# Patient Record
Sex: FEMALE | Race: WHITE | NOT HISPANIC OR LATINO | URBAN - METROPOLITAN AREA
[De-identification: names, ages, dates, MRNs, and addresses within clinical notes are randomized per-mention and may not be internally consistent; named-entity substitution may affect disease eponyms.]

---

## 2020-02-17 ENCOUNTER — OFFICE (OUTPATIENT)
Dept: URBAN - METROPOLITAN AREA CLINIC 10 | Facility: CLINIC | Age: 75
End: 2020-02-17
Payer: COMMERCIAL

## 2020-02-17 VITALS
HEART RATE: 89 BPM | WEIGHT: 131 LBS | SYSTOLIC BLOOD PRESSURE: 141 MMHG | DIASTOLIC BLOOD PRESSURE: 77 MMHG | HEIGHT: 62 IN

## 2020-02-17 DIAGNOSIS — A09 INFECTIOUS GASTROENTERITIS AND COLITIS, UNSPECIFIED: ICD-10-CM

## 2020-02-17 PROCEDURE — 99204 OFFICE O/P NEW MOD 45 MIN: CPT | Performed by: INTERNAL MEDICINE

## 2020-02-17 RX ORDER — SODIUM SULFATE, POTASSIUM SULFATE, MAGNESIUM SULFATE 17.5; 3.13; 1.6 G/ML; G/ML; G/ML
SOLUTION, CONCENTRATE ORAL
Qty: 1 | Refills: 0 | Status: ACTIVE
Start: 2020-02-17

## 2020-02-17 RX ORDER — SODIUM SULFATE, POTASSIUM SULFATE, MAGNESIUM SULFATE 17.5; 3.13; 1.6 G/ML; G/ML; G/ML
SOLUTION, CONCENTRATE ORAL
Qty: 1 | Refills: 0 | Status: COMPLETED
Start: 2020-02-17 | End: 2020-05-27

## 2020-05-27 ENCOUNTER — AMBULATORY SURGICAL CENTER (OUTPATIENT)
Dept: URBAN - METROPOLITAN AREA SURGERY 1 | Facility: SURGERY | Age: 75
End: 2020-05-27
Payer: MEDICARE

## 2020-05-27 ENCOUNTER — AMBULATORY SURGICAL CENTER (OUTPATIENT)
Dept: URBAN - METROPOLITAN AREA SURGERY 1 | Facility: SURGERY | Age: 75
End: 2020-05-27
Payer: COMMERCIAL

## 2020-05-27 ENCOUNTER — OFFICE (OUTPATIENT)
Dept: URBAN - METROPOLITAN AREA PATHOLOGY 22 | Facility: PATHOLOGY | Age: 75
End: 2020-05-27
Payer: COMMERCIAL

## 2020-05-27 VITALS
DIASTOLIC BLOOD PRESSURE: 63 MMHG | HEART RATE: 68 BPM | HEIGHT: 62 IN | SYSTOLIC BLOOD PRESSURE: 109 MMHG | RESPIRATION RATE: 16 BRPM | HEART RATE: 66 BPM | SYSTOLIC BLOOD PRESSURE: 99 MMHG | HEART RATE: 66 BPM | OXYGEN SATURATION: 99 % | HEART RATE: 66 BPM | HEART RATE: 62 BPM | HEART RATE: 68 BPM | HEART RATE: 62 BPM | DIASTOLIC BLOOD PRESSURE: 54 MMHG | HEART RATE: 68 BPM | WEIGHT: 131 LBS | TEMPERATURE: 98.4 F | SYSTOLIC BLOOD PRESSURE: 120 MMHG | RESPIRATION RATE: 15 BRPM | DIASTOLIC BLOOD PRESSURE: 57 MMHG | SYSTOLIC BLOOD PRESSURE: 120 MMHG | HEART RATE: 63 BPM | DIASTOLIC BLOOD PRESSURE: 51 MMHG | DIASTOLIC BLOOD PRESSURE: 63 MMHG | DIASTOLIC BLOOD PRESSURE: 54 MMHG | TEMPERATURE: 98.2 F | OXYGEN SATURATION: 98 % | SYSTOLIC BLOOD PRESSURE: 109 MMHG | SYSTOLIC BLOOD PRESSURE: 116 MMHG | SYSTOLIC BLOOD PRESSURE: 109 MMHG | DIASTOLIC BLOOD PRESSURE: 63 MMHG | SYSTOLIC BLOOD PRESSURE: 152 MMHG | WEIGHT: 131 LBS | SYSTOLIC BLOOD PRESSURE: 111 MMHG | OXYGEN SATURATION: 99 % | HEART RATE: 63 BPM | HEART RATE: 60 BPM | OXYGEN SATURATION: 98 % | SYSTOLIC BLOOD PRESSURE: 152 MMHG | HEART RATE: 63 BPM | TEMPERATURE: 98.4 F | DIASTOLIC BLOOD PRESSURE: 76 MMHG | RESPIRATION RATE: 16 BRPM | SYSTOLIC BLOOD PRESSURE: 99 MMHG | DIASTOLIC BLOOD PRESSURE: 54 MMHG | HEIGHT: 62 IN | OXYGEN SATURATION: 97 % | OXYGEN SATURATION: 99 % | SYSTOLIC BLOOD PRESSURE: 120 MMHG | SYSTOLIC BLOOD PRESSURE: 116 MMHG | OXYGEN SATURATION: 100 % | DIASTOLIC BLOOD PRESSURE: 57 MMHG | HEART RATE: 67 BPM | SYSTOLIC BLOOD PRESSURE: 151 MMHG | SYSTOLIC BLOOD PRESSURE: 108 MMHG | DIASTOLIC BLOOD PRESSURE: 47 MMHG | HEART RATE: 63 BPM | HEART RATE: 62 BPM | HEIGHT: 62 IN | DIASTOLIC BLOOD PRESSURE: 76 MMHG | OXYGEN SATURATION: 97 % | RESPIRATION RATE: 16 BRPM | SYSTOLIC BLOOD PRESSURE: 120 MMHG | DIASTOLIC BLOOD PRESSURE: 51 MMHG | SYSTOLIC BLOOD PRESSURE: 111 MMHG | HEART RATE: 68 BPM | SYSTOLIC BLOOD PRESSURE: 116 MMHG | HEART RATE: 65 BPM | WEIGHT: 131 LBS | SYSTOLIC BLOOD PRESSURE: 151 MMHG | DIASTOLIC BLOOD PRESSURE: 50 MMHG | SYSTOLIC BLOOD PRESSURE: 111 MMHG | HEART RATE: 60 BPM | RESPIRATION RATE: 15 BRPM | HEART RATE: 65 BPM | RESPIRATION RATE: 15 BRPM | OXYGEN SATURATION: 100 % | HEART RATE: 65 BPM | SYSTOLIC BLOOD PRESSURE: 108 MMHG | SYSTOLIC BLOOD PRESSURE: 111 MMHG | DIASTOLIC BLOOD PRESSURE: 47 MMHG | SYSTOLIC BLOOD PRESSURE: 152 MMHG | SYSTOLIC BLOOD PRESSURE: 116 MMHG | TEMPERATURE: 98.4 F | TEMPERATURE: 98.4 F | SYSTOLIC BLOOD PRESSURE: 151 MMHG | DIASTOLIC BLOOD PRESSURE: 76 MMHG | TEMPERATURE: 98.2 F | OXYGEN SATURATION: 98 % | OXYGEN SATURATION: 100 % | DIASTOLIC BLOOD PRESSURE: 57 MMHG | HEART RATE: 66 BPM | DIASTOLIC BLOOD PRESSURE: 51 MMHG | SYSTOLIC BLOOD PRESSURE: 99 MMHG | OXYGEN SATURATION: 100 % | SYSTOLIC BLOOD PRESSURE: 152 MMHG | TEMPERATURE: 98.2 F | RESPIRATION RATE: 16 BRPM | HEART RATE: 67 BPM | HEART RATE: 60 BPM | HEIGHT: 62 IN | OXYGEN SATURATION: 97 % | TEMPERATURE: 98.2 F | DIASTOLIC BLOOD PRESSURE: 47 MMHG | OXYGEN SATURATION: 98 % | DIASTOLIC BLOOD PRESSURE: 57 MMHG | SYSTOLIC BLOOD PRESSURE: 99 MMHG | DIASTOLIC BLOOD PRESSURE: 76 MMHG | HEART RATE: 62 BPM | OXYGEN SATURATION: 97 % | DIASTOLIC BLOOD PRESSURE: 54 MMHG | SYSTOLIC BLOOD PRESSURE: 109 MMHG | DIASTOLIC BLOOD PRESSURE: 50 MMHG | DIASTOLIC BLOOD PRESSURE: 63 MMHG | HEART RATE: 67 BPM | DIASTOLIC BLOOD PRESSURE: 50 MMHG | HEART RATE: 67 BPM | DIASTOLIC BLOOD PRESSURE: 51 MMHG | WEIGHT: 131 LBS | SYSTOLIC BLOOD PRESSURE: 108 MMHG | OXYGEN SATURATION: 99 % | RESPIRATION RATE: 15 BRPM | DIASTOLIC BLOOD PRESSURE: 50 MMHG | SYSTOLIC BLOOD PRESSURE: 151 MMHG | HEART RATE: 60 BPM | HEART RATE: 65 BPM | DIASTOLIC BLOOD PRESSURE: 47 MMHG | SYSTOLIC BLOOD PRESSURE: 108 MMHG

## 2020-05-27 DIAGNOSIS — Z12.11 ENCOUNTER FOR SCREENING FOR MALIGNANT NEOPLASM OF COLON: ICD-10-CM

## 2020-05-27 DIAGNOSIS — D12.0 BENIGN NEOPLASM OF CECUM: ICD-10-CM

## 2020-05-27 DIAGNOSIS — K57.30 DIVERTICULOSIS OF LARGE INTESTINE WITHOUT PERFORATION OR ABS: ICD-10-CM

## 2020-05-27 DIAGNOSIS — K64.1 SECOND DEGREE HEMORRHOIDS: ICD-10-CM

## 2020-05-27 DIAGNOSIS — K62.89 OTHER SPECIFIED DISEASES OF ANUS AND RECTUM: ICD-10-CM

## 2020-05-27 DIAGNOSIS — D12.3 BENIGN NEOPLASM OF TRANSVERSE COLON: ICD-10-CM

## 2020-05-27 DIAGNOSIS — D12.2 BENIGN NEOPLASM OF ASCENDING COLON: ICD-10-CM

## 2020-05-27 PROBLEM — K63.5 POLYP OF COLON: Status: ACTIVE | Noted: 2020-05-27

## 2020-05-27 PROCEDURE — 88305 TISSUE EXAM BY PATHOLOGIST: CPT | Performed by: INTERNAL MEDICINE

## 2020-05-27 PROCEDURE — G8918 PT W/O PREOP ORDER IV AB PRO: HCPCS | Performed by: INTERNAL MEDICINE

## 2020-05-27 PROCEDURE — 45380 COLONOSCOPY AND BIOPSY: CPT | Mod: 59,PT | Performed by: INTERNAL MEDICINE

## 2020-05-27 PROCEDURE — 45385 COLONOSCOPY W/LESION REMOVAL: CPT | Mod: PT | Performed by: INTERNAL MEDICINE

## 2020-05-27 PROCEDURE — G8907 PT DOC NO EVENTS ON DISCHARG: HCPCS | Performed by: INTERNAL MEDICINE

## 2020-05-27 NOTE — SERVICENOTES
Start time: 0957
Cecum: 1004
TI intubation: no 
End time:1011

[place] Bowel Prep Score:	  6
-right colon:		2
-transverse:          		2
-left colon:                              2

## 2020-05-27 NOTE — SERVICENOTES
Start time: 0957
Cecum: 1004
TI intubation: no 
End time:1011

[place] Bowel Prep Score:	  6
-right colon:		2
-transverse:          		2
-left colon:                              2

## 2020-05-27 NOTE — SERVICENOTES
Start time: 0957
Cecum: 1004
TI intubation: no 
End time:1011

[place] Bowel Prep Score:	  6
-right colon:		2
-transverse:          		2
-left colon:                              2

## 2020-05-27 NOTE — SERVICENOTES
Start time: 0957
Cecum: 1004
TI intubation: no 
End time:1011

[place] Bowel Prep Score:	  6
-right colon:		2
-transverse:          		2
-left colon:                              2

## 2020-06-03 ENCOUNTER — OFFICE (OUTPATIENT)
Dept: URBAN - METROPOLITAN AREA CLINIC 10 | Facility: CLINIC | Age: 75
End: 2020-06-03
Payer: COMMERCIAL

## 2020-06-03 VITALS
SYSTOLIC BLOOD PRESSURE: 150 MMHG | DIASTOLIC BLOOD PRESSURE: 75 MMHG | WEIGHT: 131 LBS | HEIGHT: 62 IN | HEART RATE: 72 BPM

## 2020-06-03 DIAGNOSIS — K58.9 IRRITABLE BOWEL SYNDROME WITHOUT DIARRHEA: ICD-10-CM

## 2020-06-03 PROCEDURE — 99213 OFFICE O/P EST LOW 20 MIN: CPT | Performed by: INTERNAL MEDICINE

## 2020-06-03 RX ORDER — RIFAXIMIN 550 MG/1
TABLET ORAL
Qty: 42 | Refills: 0 | Status: COMPLETED
Start: 2020-06-03 | End: 2023-10-04

## 2020-06-04 LAB
CALPROTECTIN, FECAL: 33 UG/G (ref 0–120)
GI PROFILE, STOOL, PCR: ADENOVIRUS F 40/41: NOT DETECTED
GI PROFILE, STOOL, PCR: ASTROVIRUS: NOT DETECTED
GI PROFILE, STOOL, PCR: C DIFFICILE TOXIN A/B: NOT DETECTED
GI PROFILE, STOOL, PCR: CAMPYLOBACTER: NOT DETECTED
GI PROFILE, STOOL, PCR: CRYPTOSPORIDIUM: NOT DETECTED
GI PROFILE, STOOL, PCR: CYCLOSPORA CAYETANENSIS: NOT DETECTED
GI PROFILE, STOOL, PCR: E COLI O157: (no result)
GI PROFILE, STOOL, PCR: ENTAMOEBA HISTOLYTICA: NOT DETECTED
GI PROFILE, STOOL, PCR: ENTEROAGGREGATIVE E COLI: NOT DETECTED
GI PROFILE, STOOL, PCR: ENTEROPATHOGENIC E COLI: NOT DETECTED
GI PROFILE, STOOL, PCR: ENTEROTOXIGENIC E COLI: NOT DETECTED
GI PROFILE, STOOL, PCR: GIARDIA LAMBLIA: NOT DETECTED
GI PROFILE, STOOL, PCR: NOROVIRUS GI/GII: NOT DETECTED
GI PROFILE, STOOL, PCR: PLESIOMONAS SHIGELLOIDES: NOT DETECTED
GI PROFILE, STOOL, PCR: ROTAVIRUS A: NOT DETECTED
GI PROFILE, STOOL, PCR: SALMONELLA: NOT DETECTED
GI PROFILE, STOOL, PCR: SAPOVIRUS: NOT DETECTED
GI PROFILE, STOOL, PCR: SHIGA-TOXIN-PRODUCING E COLI: NOT DETECTED
GI PROFILE, STOOL, PCR: SHIGELLA/ENTEROINVASIVE E COLI: NOT DETECTED
GI PROFILE, STOOL, PCR: VIBRIO CHOLERAE: NOT DETECTED
GI PROFILE, STOOL, PCR: VIBRIO: NOT DETECTED
GI PROFILE, STOOL, PCR: YERSINIA ENTEROCOLITICA: NOT DETECTED

## 2020-11-30 ENCOUNTER — OFFICE (OUTPATIENT)
Dept: URBAN - METROPOLITAN AREA CLINIC 10 | Facility: CLINIC | Age: 75
End: 2020-11-30
Payer: COMMERCIAL

## 2020-11-30 VITALS
DIASTOLIC BLOOD PRESSURE: 63 MMHG | WEIGHT: 141 LBS | HEART RATE: 75 BPM | HEIGHT: 62 IN | SYSTOLIC BLOOD PRESSURE: 144 MMHG

## 2020-11-30 DIAGNOSIS — K58.9 IRRITABLE BOWEL SYNDROME WITHOUT DIARRHEA: ICD-10-CM

## 2020-11-30 PROCEDURE — 99212 OFFICE O/P EST SF 10 MIN: CPT | Performed by: INTERNAL MEDICINE

## 2023-08-24 ENCOUNTER — OFFICE (OUTPATIENT)
Dept: URBAN - METROPOLITAN AREA CLINIC 10 | Facility: CLINIC | Age: 78
End: 2023-08-24

## 2023-08-24 VITALS
HEIGHT: 62 IN | SYSTOLIC BLOOD PRESSURE: 148 MMHG | HEART RATE: 76 BPM | OXYGEN SATURATION: 97 % | DIASTOLIC BLOOD PRESSURE: 64 MMHG | WEIGHT: 112 LBS

## 2023-08-24 DIAGNOSIS — Z86.010 PERSONAL HISTORY OF COLONIC POLYPS: ICD-10-CM

## 2023-08-24 RX ORDER — SODIUM PICOSULFATE, MAGNESIUM OXIDE, AND ANHYDROUS CITRIC ACID 10; 3.5; 12 MG/160ML; G/160ML; G/160ML
LIQUID ORAL
Qty: 350 | Refills: 0 | Status: COMPLETED
Start: 2023-08-24 | End: 2023-10-04

## 2023-10-04 ENCOUNTER — AMBULATORY SURGICAL CENTER (OUTPATIENT)
Dept: URBAN - METROPOLITAN AREA SURGERY 1 | Facility: SURGERY | Age: 78
End: 2023-10-04
Payer: MEDICARE

## 2023-10-04 ENCOUNTER — OFFICE (OUTPATIENT)
Dept: URBAN - METROPOLITAN AREA PATHOLOGY 12 | Facility: PATHOLOGY | Age: 78
End: 2023-10-04
Payer: MEDICARE

## 2023-10-04 VITALS
TEMPERATURE: 98.1 F | WEIGHT: 107 LBS | TEMPERATURE: 97.2 F | OXYGEN SATURATION: 97 % | SYSTOLIC BLOOD PRESSURE: 127 MMHG | DIASTOLIC BLOOD PRESSURE: 51 MMHG | DIASTOLIC BLOOD PRESSURE: 50 MMHG | SYSTOLIC BLOOD PRESSURE: 146 MMHG | HEART RATE: 65 BPM | DIASTOLIC BLOOD PRESSURE: 54 MMHG | HEART RATE: 63 BPM | SYSTOLIC BLOOD PRESSURE: 114 MMHG | RESPIRATION RATE: 18 BRPM | SYSTOLIC BLOOD PRESSURE: 111 MMHG | OXYGEN SATURATION: 99 % | TEMPERATURE: 97.2 F | SYSTOLIC BLOOD PRESSURE: 116 MMHG | DIASTOLIC BLOOD PRESSURE: 51 MMHG | SYSTOLIC BLOOD PRESSURE: 116 MMHG | HEART RATE: 66 BPM | HEART RATE: 65 BPM | OXYGEN SATURATION: 97 % | TEMPERATURE: 98.1 F | OXYGEN SATURATION: 99 % | OXYGEN SATURATION: 98 % | DIASTOLIC BLOOD PRESSURE: 49 MMHG | TEMPERATURE: 98.1 F | HEART RATE: 66 BPM | OXYGEN SATURATION: 98 % | DIASTOLIC BLOOD PRESSURE: 61 MMHG | WEIGHT: 107 LBS | SYSTOLIC BLOOD PRESSURE: 146 MMHG | SYSTOLIC BLOOD PRESSURE: 114 MMHG | SYSTOLIC BLOOD PRESSURE: 116 MMHG | SYSTOLIC BLOOD PRESSURE: 114 MMHG | RESPIRATION RATE: 18 BRPM | DIASTOLIC BLOOD PRESSURE: 49 MMHG | RESPIRATION RATE: 13 BRPM | RESPIRATION RATE: 17 BRPM | SYSTOLIC BLOOD PRESSURE: 127 MMHG | WEIGHT: 107 LBS | HEART RATE: 65 BPM | RESPIRATION RATE: 18 BRPM | RESPIRATION RATE: 13 BRPM | DIASTOLIC BLOOD PRESSURE: 54 MMHG | TEMPERATURE: 97.2 F | HEIGHT: 62 IN | TEMPERATURE: 97.4 F | HEART RATE: 63 BPM | HEIGHT: 62 IN | DIASTOLIC BLOOD PRESSURE: 49 MMHG | RESPIRATION RATE: 17 BRPM | DIASTOLIC BLOOD PRESSURE: 54 MMHG | DIASTOLIC BLOOD PRESSURE: 61 MMHG | SYSTOLIC BLOOD PRESSURE: 146 MMHG | HEART RATE: 66 BPM | OXYGEN SATURATION: 99 % | SYSTOLIC BLOOD PRESSURE: 127 MMHG | HEIGHT: 62 IN | OXYGEN SATURATION: 96 % | HEART RATE: 63 BPM | TEMPERATURE: 97.4 F | OXYGEN SATURATION: 97 % | OXYGEN SATURATION: 96 % | DIASTOLIC BLOOD PRESSURE: 51 MMHG | SYSTOLIC BLOOD PRESSURE: 111 MMHG | DIASTOLIC BLOOD PRESSURE: 50 MMHG | DIASTOLIC BLOOD PRESSURE: 61 MMHG | RESPIRATION RATE: 17 BRPM | OXYGEN SATURATION: 98 % | RESPIRATION RATE: 13 BRPM | OXYGEN SATURATION: 96 % | SYSTOLIC BLOOD PRESSURE: 111 MMHG | DIASTOLIC BLOOD PRESSURE: 50 MMHG | TEMPERATURE: 97.4 F

## 2023-10-04 DIAGNOSIS — K63.5 POLYP OF COLON: ICD-10-CM

## 2023-10-04 DIAGNOSIS — D12.2 BENIGN NEOPLASM OF ASCENDING COLON: ICD-10-CM

## 2023-10-04 DIAGNOSIS — K57.30 DIVERTICULOSIS OF LARGE INTESTINE WITHOUT PERFORATION OR ABS: ICD-10-CM

## 2023-10-04 DIAGNOSIS — Z86.010 PERSONAL HISTORY OF COLONIC POLYPS: ICD-10-CM

## 2023-10-04 DIAGNOSIS — Z09 ENCOUNTER FOR FOLLOW-UP EXAMINATION AFTER COMPLETED TREATMEN: ICD-10-CM

## 2023-10-04 PROCEDURE — 88305 TISSUE EXAM BY PATHOLOGIST: CPT

## 2023-10-04 PROCEDURE — 45385 COLONOSCOPY W/LESION REMOVAL: CPT | Mod: PT | Performed by: INTERNAL MEDICINE

## 2023-10-06 LAB
GASTRO ONE PATHOLOGY: PDF REPORT: (no result)